# Patient Record
Sex: MALE | Race: BLACK OR AFRICAN AMERICAN | Employment: STUDENT | URBAN - METROPOLITAN AREA
[De-identification: names, ages, dates, MRNs, and addresses within clinical notes are randomized per-mention and may not be internally consistent; named-entity substitution may affect disease eponyms.]

---

## 2020-07-27 ENCOUNTER — HOSPITAL ENCOUNTER (EMERGENCY)
Facility: CLINIC | Age: 35
Discharge: HOME | End: 2020-07-27
Attending: EMERGENCY MEDICINE | Admitting: EMERGENCY MEDICINE
Payer: COMMERCIAL

## 2020-07-27 VITALS
DIASTOLIC BLOOD PRESSURE: 86 MMHG | OXYGEN SATURATION: 98 % | SYSTOLIC BLOOD PRESSURE: 107 MMHG | HEART RATE: 66 BPM | RESPIRATION RATE: 16 BRPM

## 2020-07-27 DIAGNOSIS — H57.12 EYE PAIN, LEFT: ICD-10-CM

## 2020-07-27 DIAGNOSIS — H00.025 HORDEOLUM INTERNUM OF LEFT LOWER EYELID: ICD-10-CM

## 2020-07-27 PROCEDURE — 99283 EMERGENCY DEPT VISIT LOW MDM: CPT

## 2020-07-27 PROCEDURE — 99282 EMERGENCY DEPT VISIT SF MDM: CPT | Mod: Z6 | Performed by: EMERGENCY MEDICINE

## 2020-07-27 PROCEDURE — 25000132 ZZH RX MED GY IP 250 OP 250 PS 637: Performed by: EMERGENCY MEDICINE

## 2020-07-27 RX ORDER — IBUPROFEN 600 MG/1
600 TABLET, FILM COATED ORAL ONCE
Status: COMPLETED | OUTPATIENT
Start: 2020-07-27 | End: 2020-07-27

## 2020-07-27 RX ORDER — SODIUM CHLORIDE 9 MG/ML
INJECTION, SOLUTION INTRAVENOUS
Status: DISCONTINUED
Start: 2020-07-27 | End: 2020-07-27 | Stop reason: HOSPADM

## 2020-07-27 RX ADMIN — IBUPROFEN 600 MG: 600 TABLET ORAL at 06:25

## 2020-07-27 NOTE — DISCHARGE INSTRUCTIONS
Thank you for your patience today.  Please apply warm compress to your left eye for 15 minutes 4-6 times a day.  Please take ibuprofen 600 mg or tylenol 1000 mg every 6 hours as needed for pain. (you may alternate these so you are taking something every 3 hours). Please follow-up with an eye doctor in the next 2-3 days for further evaluation if you have no improvement of your symptoms.  Please return to the ER if you develop vision loss, severe pain, high fever, or any worsening of your current symptoms.  It was a pleasure taking care of you today.  We hope you feel better soon.

## 2020-07-27 NOTE — ED PROVIDER NOTES
ED Provider Note  [facility]      History     Chief Complaint   Patient presents with     Eye Problem     left eye, thought it was a stye but it has gotten worse, thought it was producing pus from the bottom lid     HPI  Russel Hudson is a 34 year old male who has no significant past medical history who presents the emergency department from home with his wife with a complaint of left eye pain.  Patient complains of left eye pain over the past few days.  Patient's wife reports that initially thought it was just a stye but has gotten worse with increase in pain and swelling along with some drainage (described as pus).  Patient denies any fever, chills, headache, vision changes, no other complaints.  Patient's wife reports history of multiple styes in the past.    Past Medical History  History reviewed. No pertinent past medical history.  History reviewed. No pertinent surgical history.  No current outpatient medications on file.    No Known Allergies  Past medical history, past surgical history, medications, and allergies were reviewed with the patient. Additional pertinent items: None    Family History  History reviewed. No pertinent family history.  Family history was reviewed with the patient. Additional pertinent items: None    Social History  Social History     Tobacco Use     Smoking status: Current Every Day Smoker     Packs/day: 0.25     Smokeless tobacco: Never Used   Substance Use Topics     Alcohol use: Not Currently     Drug use: Yes     Types: Marijuana     Comment: frequently      Social history was reviewed with the patient. Additional pertinent items: None    Review of Systems  A complete review of systems was performed with pertinent positives and negatives noted in the HPI, and all other systems negative.    Physical Exam   BP: 107/86  Pulse: 66  Resp: 16  SpO2: 98 %  Physical Exam  General: Afebrile, no acute distress   HEENT: Normocephalic, atraumatic, left eye with  small painful bump to medial aspect of left inner lower eyelid suggestive of internal hordeolum, left conjunctiva mild erythematous, MMM  Neck: non-tender, supple  Cardio: regular rate.   Resp: Normal work of breathing, no respiratory distress  Chest/Back: no visual signs of trauma  Abdomen: soft, no tenderness  Neuro: alert and fully oriented. CN II-XII grossly intact. Grossly normal strength and sensation in all extremities.   MSK: no deformities. Normal range of motion  Integumentary/Skin: no rash visualized, normal color  Psych: normal affect, normal behavior    ED Course      Procedures       No results found for any visits on 07/27/20.  Medications - No data to display     Assessments & Plan (with Medical Decision Making)   Russel Hudson is a 34 year old male who has no significant past medical history who presents the emergency department from home with his wife with a complaint of left eye pain.  Upon arrival patient is well-appearing, afebrile, no distress.  Visual acuity 20/10 both eye. On examination left eye with conjunctiva mildly injected, left eye with small painful bump to medial aspect of left inner lower eyelid suggestive of internal hordeolum. Ibuprofen given for pain, recommend warm compresses. Encourage follow-up with ophthalmology in 2 to 3 days improvement of symptoms.  Return precautions discussed if high fever, severe pain, vision loss, new or worsening symptoms.  Patient understands agrees to the plan.    I have reviewed the nursing notes. I have reviewed the findings, diagnosis, plan and need for follow up with the patient.    New Prescriptions    No medications on file       Final diagnoses:   Eye pain, left   Hordeolum internum of left lower eyelid       --  Zoraida Castellanos MD   Emergency Medicine   [facility], EMERGENCY DEPARTMENT  7/27/2020     Zoraida Castellanos MD  07/27/20 [id number]

## 2020-07-27 NOTE — ED AVS SNAPSHOT
[facility], [place], Emergency Department  [address]  [facility] [address]  Phone:  [phone]  Fax:  [phone]                                    Russel Hudson   MRN: [id number]    Department:  [facility], Emergency Department   Date of Visit:  7/27/2020           After Visit Summary Signature Page    I have received my discharge instructions, and my questions have been answered. I have discussed any challenges I see with this plan with the nurse or doctor.    ..........................................................................................................................................  Patient/Patient Representative Signature      ..........................................................................................................................................  Patient Representative Print Name and Relationship to Patient    ..................................................               ................................................  Date                                   Time    ..........................................................................................................................................  Reviewed by Signature/Title    ...................................................              ..............................................  Date                                               Time          22EPIC Rev 08/18